# Patient Record
Sex: MALE | Race: WHITE | NOT HISPANIC OR LATINO | Employment: OTHER | URBAN - METROPOLITAN AREA
[De-identification: names, ages, dates, MRNs, and addresses within clinical notes are randomized per-mention and may not be internally consistent; named-entity substitution may affect disease eponyms.]

---

## 2018-01-18 ENCOUNTER — ESTABLISHED COMPREHENSIVE EXAM (OUTPATIENT)
Dept: URBAN - METROPOLITAN AREA CLINIC 39 | Facility: CLINIC | Age: 53
End: 2018-01-18

## 2018-01-18 DIAGNOSIS — H52.03: ICD-10-CM

## 2018-01-18 PROCEDURE — 92014 COMPRE OPH EXAM EST PT 1/>: CPT

## 2018-01-18 PROCEDURE — 92015 DETERMINE REFRACTIVE STATE: CPT

## 2018-01-18 ASSESSMENT — VISUAL ACUITY
OS_CC: 20/25-2
OD_CC: J1
OS_CC: J1
OD_SC: J12
OS_SC: 20/60+2
OD_CC: 20/25-2
OS_SC: J12
OD_SC: 20/60

## 2018-01-18 ASSESSMENT — TONOMETRY
OS_IOP_MMHG: 14
OD_IOP_MMHG: 14

## 2020-07-02 ENCOUNTER — ESTABLISHED COMPREHENSIVE EXAM (OUTPATIENT)
Dept: URBAN - METROPOLITAN AREA CLINIC 39 | Facility: CLINIC | Age: 55
End: 2020-07-02

## 2020-07-02 DIAGNOSIS — Z01.00: ICD-10-CM

## 2020-07-02 DIAGNOSIS — H52.03: ICD-10-CM

## 2020-07-02 PROCEDURE — 92014 COMPRE OPH EXAM EST PT 1/>: CPT

## 2020-07-02 PROCEDURE — 92015 DETERMINE REFRACTIVE STATE: CPT

## 2020-07-02 ASSESSMENT — VISUAL ACUITY
OD_SC: 20/70+1
OS_SC: 20/60
OS_CC: J1
OD_CC: 20/20-3
OS_SC: J10-
OS_CC: 20/25
OD_SC: J10-
OD_CC: J3-

## 2020-07-02 ASSESSMENT — TONOMETRY
OD_IOP_MMHG: 14
OS_IOP_MMHG: 15

## 2022-06-09 ENCOUNTER — COMPREHENSIVE EXAM (OUTPATIENT)
Dept: URBAN - METROPOLITAN AREA CLINIC 36 | Facility: CLINIC | Age: 57
End: 2022-06-09

## 2022-06-09 DIAGNOSIS — Z97.3: ICD-10-CM

## 2022-06-09 DIAGNOSIS — H52.7: ICD-10-CM

## 2022-06-09 DIAGNOSIS — H25.813: ICD-10-CM

## 2022-06-09 PROCEDURE — 92310-1 LEVEL 1 CONTACT LENS MANAGEMENT

## 2022-06-09 PROCEDURE — 92014 COMPRE OPH EXAM EST PT 1/>: CPT

## 2022-06-09 PROCEDURE — 92310PDW PREMIUM SPECIALTY DAILY WEAR

## 2022-06-09 PROCEDURE — 92015 DETERMINE REFRACTIVE STATE: CPT

## 2022-06-09 ASSESSMENT — TONOMETRY
OS_IOP_MMHG: 12
OD_IOP_MMHG: 12

## 2022-06-09 ASSESSMENT — VISUAL ACUITY
OD_SC: 20/50
OS_CC: 20/20-1
OD_CC: J2
OD_CC: 20/25-1
OS_SC: 20/50-2
OD_SC: J10
OS_SC: J10
OS_CC: J1